# Patient Record
Sex: FEMALE | Race: WHITE | NOT HISPANIC OR LATINO | URBAN - METROPOLITAN AREA
[De-identification: names, ages, dates, MRNs, and addresses within clinical notes are randomized per-mention and may not be internally consistent; named-entity substitution may affect disease eponyms.]

---

## 2020-10-12 ENCOUNTER — TELEPHONE ENCOUNTER (OUTPATIENT)
Dept: URBAN - METROPOLITAN AREA CLINIC 117 | Facility: CLINIC | Age: 60
End: 2020-10-12

## 2020-10-13 ENCOUNTER — OUT OF OFFICE VISIT (OUTPATIENT)
Dept: URBAN - METROPOLITAN AREA MEDICAL CENTER 16 | Facility: MEDICAL CENTER | Age: 60
End: 2020-10-13
Payer: MEDICARE

## 2020-10-13 DIAGNOSIS — R19.7 ACUTE DIARRHEA: ICD-10-CM

## 2020-10-13 DIAGNOSIS — K82.8 ADENOMYOSIS OF GALLBLADDER: ICD-10-CM

## 2020-10-13 DIAGNOSIS — K80.20 ASYMPTOMATIC CHOLELITHIASIS: ICD-10-CM

## 2020-10-13 DIAGNOSIS — R10.31 ABDOMINAL DISCOMFORT IN RIGHT LOWER QUADRANT: ICD-10-CM

## 2020-10-13 DIAGNOSIS — A04.72 C. DIFFICILE COLITIS: ICD-10-CM

## 2020-10-13 PROCEDURE — 99232 SBSQ HOSP IP/OBS MODERATE 35: CPT | Performed by: INTERNAL MEDICINE

## 2020-10-13 PROCEDURE — G8427 DOCREV CUR MEDS BY ELIG CLIN: HCPCS | Performed by: INTERNAL MEDICINE

## 2020-10-13 PROCEDURE — 99233 SBSQ HOSP IP/OBS HIGH 50: CPT | Performed by: INTERNAL MEDICINE

## 2020-10-13 PROCEDURE — 99222 1ST HOSP IP/OBS MODERATE 55: CPT | Performed by: INTERNAL MEDICINE

## 2020-10-17 ENCOUNTER — OUT OF OFFICE VISIT (OUTPATIENT)
Dept: URBAN - METROPOLITAN AREA MEDICAL CENTER 16 | Facility: MEDICAL CENTER | Age: 60
End: 2020-10-17
Payer: MEDICARE

## 2020-10-17 DIAGNOSIS — E87.6 HYPOKALEMIA: ICD-10-CM

## 2020-10-17 DIAGNOSIS — I10 ACCELERATED ESSENTIAL HYPERTENSION: ICD-10-CM

## 2020-10-17 DIAGNOSIS — R10.31 ABDOMINAL DISCOMFORT IN RIGHT LOWER QUADRANT: ICD-10-CM

## 2020-10-17 DIAGNOSIS — A04.72 C. DIFFICILE COLITIS: ICD-10-CM

## 2020-10-17 PROCEDURE — 99232 SBSQ HOSP IP/OBS MODERATE 35: CPT | Performed by: INTERNAL MEDICINE

## 2020-10-22 ENCOUNTER — TELEPHONE ENCOUNTER (OUTPATIENT)
Dept: URBAN - METROPOLITAN AREA CLINIC 117 | Facility: CLINIC | Age: 60
End: 2020-10-22

## 2020-12-09 ENCOUNTER — OFFICE VISIT (OUTPATIENT)
Dept: URBAN - METROPOLITAN AREA CLINIC 118 | Facility: CLINIC | Age: 60
End: 2020-12-09

## 2021-02-03 ENCOUNTER — OFFICE VISIT (OUTPATIENT)
Dept: URBAN - METROPOLITAN AREA CLINIC 118 | Facility: CLINIC | Age: 61
End: 2021-02-03

## 2021-06-21 ENCOUNTER — LAB OUTSIDE AN ENCOUNTER (OUTPATIENT)
Dept: URBAN - METROPOLITAN AREA CLINIC 118 | Facility: CLINIC | Age: 61
End: 2021-06-21

## 2021-06-21 ENCOUNTER — CLAIMS CREATED FROM THE CLAIM WINDOW (OUTPATIENT)
Dept: URBAN - METROPOLITAN AREA CLINIC 118 | Facility: CLINIC | Age: 61
End: 2021-06-21
Payer: MEDICARE

## 2021-06-21 VITALS
WEIGHT: 293 LBS | BODY MASS INDEX: 45.99 KG/M2 | HEART RATE: 65 BPM | TEMPERATURE: 99.2 F | SYSTOLIC BLOOD PRESSURE: 138 MMHG | DIASTOLIC BLOOD PRESSURE: 81 MMHG | HEIGHT: 67 IN

## 2021-06-21 DIAGNOSIS — D3A.8 NEUROENDOCRINE NEOPLASM OF APPENDIX: ICD-10-CM

## 2021-06-21 DIAGNOSIS — K62.5 RECTAL BLEEDING: ICD-10-CM

## 2021-06-21 DIAGNOSIS — R19.4 BOWEL HABIT CHANGES: ICD-10-CM

## 2021-06-21 DIAGNOSIS — Z86.010 PERSONAL HISTORY OF COLONIC POLYPS: ICD-10-CM

## 2021-06-21 DIAGNOSIS — R10.30 LOWER ABDOMINAL PAIN: ICD-10-CM

## 2021-06-21 PROBLEM — 428283002: Status: ACTIVE | Noted: 2021-06-21

## 2021-06-21 PROBLEM — 54586004 LOWER ABDOMINAL PAIN: Status: ACTIVE | Noted: 2021-06-21

## 2021-06-21 PROCEDURE — 99214 OFFICE O/P EST MOD 30 MIN: CPT | Performed by: INTERNAL MEDICINE

## 2021-06-21 PROCEDURE — 99204 OFFICE O/P NEW MOD 45 MIN: CPT | Performed by: INTERNAL MEDICINE

## 2021-06-21 RX ORDER — METHYLNALTREXONE BROMIDE 150 MG/1
TAKE 3 TABLETS (450 MG) BY ORAL ROUTE ONCE DAILY IN THE MORNING TABLET ORAL 1
Qty: 0 | Refills: 0 | Status: ACTIVE | COMMUNITY
Start: 1900-01-01

## 2021-06-21 RX ORDER — OXYCODONE HYDROCHLORIDE AND ACETAMINOPHEN 10; 325 MG/1; MG/1
TABLET ORAL
Qty: 0 | Refills: 0 | Status: ACTIVE | COMMUNITY
Start: 1900-01-01

## 2021-06-21 NOTE — HPI-TODAY'S VISIT:
59 yo F was referred by Dr. Rebeca Hobson for colonoscopy. A copy of this document will be sent to the provider. Pt had episode of RLQ pain in 4/2021 with CT showing thickening of distal appendix, single gallstone. Laparoscopic appendectomy was completed on 4/28/21 with evidence of neuroendocrine tumor. Pt following oncology for workup of possible carcinoid syndrome given active pulmonary sx, chronic GI sx, hot flashes. Last colonoscopy on 8/11/2014 with TA polyp, diverticulosis, internal hemorrhoids. Today, she complains of altering diarrhea and constipation for several years. She notes BRBPR filling toilet bowl with constipation and hard stools. She also complains of lower abd pain that is worsening, no relief with BMs. Pt admits SOB, occasional wheezing. She denies unintentional weight loss, recent abx use, fever. FH of colon cancer in grandmother.

## 2021-06-21 NOTE — PHYSICAL EXAM CONSTITUTIONAL:
normal,  alert,  in no acute distress,  well developed, well nourished, ambulating with walker, normal communication ability

## 2021-06-21 NOTE — PHYSICAL EXAM GASTROINTESTINAL
Abdomen,  soft, bilateral lower abd TTP, nondistended,  no guarding or rigidity,  no masses palpable,  normal bowel sounds,  Liver and Spleen, no hepatosplenomegaly

## 2021-06-22 LAB
BASO (ABSOLUTE): 0
BASOS: 0
EOS (ABSOLUTE): 0.2
EOS: 2
HEMATOCRIT: 39
HEMATOLOGY COMMENTS:: (no result)
HEMOGLOBIN: 12
IMMATURE CELLS: (no result)
IMMATURE GRANS (ABS): 0
IMMATURE GRANULOCYTES: 0
LYMPHS (ABSOLUTE): 1.8
LYMPHS: 21
MCH: 27.5
MCHC: 30.8
MCV: 89
MONOCYTES(ABSOLUTE): 0.8
MONOCYTES: 10
NEUTROPHILS (ABSOLUTE): 5.7
NEUTROPHILS: 67
NRBC: (no result)
PLATELETS: 267
RBC: 4.36
RDW: 14.6
WBC: 8.5

## 2021-06-26 ENCOUNTER — TELEPHONE ENCOUNTER (OUTPATIENT)
Dept: URBAN - METROPOLITAN AREA CLINIC 92 | Facility: CLINIC | Age: 61
End: 2021-06-26

## 2021-06-26 RX ORDER — AMOXICILLIN AND CLAVULANATE POTASSIUM 500; 125 MG/1; 1/1
1 TABLET TABLET, FILM COATED ORAL
Qty: 21 | Refills: 0 | OUTPATIENT
Start: 2021-06-26 | End: 2021-07-03

## 2021-07-01 ENCOUNTER — TELEPHONE ENCOUNTER (OUTPATIENT)
Dept: URBAN - METROPOLITAN AREA CLINIC 118 | Facility: CLINIC | Age: 61
End: 2021-07-01

## 2021-07-19 ENCOUNTER — TELEPHONE ENCOUNTER (OUTPATIENT)
Dept: URBAN - METROPOLITAN AREA CLINIC 118 | Facility: CLINIC | Age: 61
End: 2021-07-19

## 2021-07-23 ENCOUNTER — LAB OUTSIDE AN ENCOUNTER (OUTPATIENT)
Dept: URBAN - METROPOLITAN AREA CLINIC 118 | Facility: CLINIC | Age: 61
End: 2021-07-23

## 2021-07-23 ENCOUNTER — TELEPHONE ENCOUNTER (OUTPATIENT)
Dept: URBAN - METROPOLITAN AREA CLINIC 118 | Facility: CLINIC | Age: 61
End: 2021-07-23

## 2021-08-12 ENCOUNTER — OFFICE VISIT (OUTPATIENT)
Dept: URBAN - METROPOLITAN AREA SURGERY CENTER 23 | Facility: SURGERY CENTER | Age: 61
End: 2021-08-12

## 2021-08-12 ENCOUNTER — TELEPHONE ENCOUNTER (OUTPATIENT)
Dept: URBAN - METROPOLITAN AREA CLINIC 118 | Facility: CLINIC | Age: 61
End: 2021-08-12

## 2021-08-12 RX ORDER — METHYLNALTREXONE BROMIDE 150 MG/1
TAKE 3 TABLETS (450 MG) BY ORAL ROUTE ONCE DAILY IN THE MORNING TABLET ORAL 1
Qty: 0 | Refills: 0 | Status: ACTIVE | COMMUNITY
Start: 1900-01-01

## 2021-08-12 RX ORDER — OXYCODONE HYDROCHLORIDE AND ACETAMINOPHEN 10; 325 MG/1; MG/1
TABLET ORAL
Qty: 0 | Refills: 0 | Status: ACTIVE | COMMUNITY
Start: 1900-01-01

## 2021-08-13 PROBLEM — 12063002: Status: ACTIVE | Noted: 2021-06-21

## 2021-08-13 PROBLEM — 725167001: Status: ACTIVE | Noted: 2021-06-21

## 2021-08-26 ENCOUNTER — OFFICE VISIT (OUTPATIENT)
Dept: URBAN - METROPOLITAN AREA SURGERY CENTER 23 | Facility: SURGERY CENTER | Age: 61
End: 2021-08-26
Payer: MEDICARE

## 2021-08-26 DIAGNOSIS — Z12.11 AVERAGE RISK FOR CRC. DUE TO PT'S CO-MORBID STATE WITH END STAGE DEMENTIA, HIGH RISK FOR ANESTHESIA (PER NEUROLOGY); INABILITY TO TAKE A BOWEL PREP....WOULD NOT ADVISE ANY COLORECTAL CANCER SCREENING INCLUDING STOOL TEST FOR FECAL BLOOD.: ICD-10-CM

## 2021-08-26 DIAGNOSIS — Z53.8 FAILED ATTEMPTED SURGICAL PROCEDURE: ICD-10-CM

## 2021-08-26 PROCEDURE — G0121 COLON CA SCRN NOT HI RSK IND: HCPCS | Performed by: INTERNAL MEDICINE

## 2021-08-26 PROCEDURE — G8907 PT DOC NO EVENTS ON DISCHARG: HCPCS | Performed by: INTERNAL MEDICINE

## 2021-08-26 RX ORDER — METHYLNALTREXONE BROMIDE 150 MG/1
TAKE 3 TABLETS (450 MG) BY ORAL ROUTE ONCE DAILY IN THE MORNING TABLET ORAL 1
Qty: 0 | Refills: 0 | Status: ACTIVE | COMMUNITY
Start: 1900-01-01

## 2021-08-26 RX ORDER — OXYCODONE HYDROCHLORIDE AND ACETAMINOPHEN 10; 325 MG/1; MG/1
TABLET ORAL
Qty: 0 | Refills: 0 | Status: ACTIVE | COMMUNITY
Start: 1900-01-01

## 2021-11-30 ENCOUNTER — OFFICE VISIT (OUTPATIENT)
Dept: URBAN - METROPOLITAN AREA CLINIC 118 | Facility: CLINIC | Age: 61
End: 2021-11-30
Payer: MEDICARE

## 2021-11-30 DIAGNOSIS — A04.72 C. DIFFICILE COLITIS: ICD-10-CM

## 2021-11-30 DIAGNOSIS — R19.4 CHANGE IN BOWEL HABITS: ICD-10-CM

## 2021-11-30 PROCEDURE — 99214 OFFICE O/P EST MOD 30 MIN: CPT | Performed by: INTERNAL MEDICINE

## 2021-11-30 RX ORDER — OXYCODONE HYDROCHLORIDE AND ACETAMINOPHEN 10; 325 MG/1; MG/1
TABLET ORAL
Qty: 0 | Refills: 0 | Status: ACTIVE | COMMUNITY
Start: 1900-01-01

## 2021-11-30 RX ORDER — HYDROCORTISONE 2.5% 25 MG/G
1 APPLICATION CREAM TOPICAL TWICE A DAY
Qty: 30 | Refills: 3 | OUTPATIENT
Start: 2021-11-30 | End: 2022-03-30

## 2021-11-30 RX ORDER — METHYLNALTREXONE BROMIDE 150 MG/1
TAKE 3 TABLETS (450 MG) BY ORAL ROUTE ONCE DAILY IN THE MORNING TABLET ORAL 1
Qty: 0 | Refills: 0 | Status: DISCONTINUED | COMMUNITY
Start: 1900-01-01

## 2021-11-30 NOTE — HPI-TODAY'S VISIT:
pt reports h/o C. diff in 9/2020 and was hospitalized, left hospital before completing treatment (per pt) now with bowel changes. pt reprots 2-3 loose stools per day followed by days of constipation. pt denies rectal bleeing, weight loss or anemia. pt feels symptoms related to C. diff. No weight loss or anemia. No UGI symptoms. pt feels symptoms destroying quality of life and would like further management.

## 2021-12-08 LAB — C DIFFICILE TOXIN GENE NAA: NEGATIVE

## 2022-04-26 ENCOUNTER — TELEPHONE ENCOUNTER (OUTPATIENT)
Dept: URBAN - METROPOLITAN AREA CLINIC 118 | Facility: CLINIC | Age: 62
End: 2022-04-26

## 2022-04-26 ENCOUNTER — OUT OF OFFICE VISIT (OUTPATIENT)
Dept: URBAN - METROPOLITAN AREA MEDICAL CENTER 16 | Facility: MEDICAL CENTER | Age: 62
End: 2022-04-26
Payer: MEDICARE

## 2022-04-26 DIAGNOSIS — K92.1 ACUTE MELENA: ICD-10-CM

## 2022-04-26 DIAGNOSIS — K80.20 ASYMPTOMATIC CHOLELITHIASIS: ICD-10-CM

## 2022-04-26 DIAGNOSIS — K76.0 FATTY (CHANGE OF) LIVER: ICD-10-CM

## 2022-04-26 DIAGNOSIS — K57.30 ACQUIRED DIVERTICULOSIS OF COLON: ICD-10-CM

## 2022-04-26 PROCEDURE — 99222 1ST HOSP IP/OBS MODERATE 55: CPT | Performed by: INTERNAL MEDICINE

## 2022-04-26 PROCEDURE — G8427 DOCREV CUR MEDS BY ELIG CLIN: HCPCS | Performed by: INTERNAL MEDICINE

## 2022-04-27 ENCOUNTER — OUT OF OFFICE VISIT (OUTPATIENT)
Dept: URBAN - METROPOLITAN AREA MEDICAL CENTER 16 | Facility: MEDICAL CENTER | Age: 62
End: 2022-04-27
Payer: MEDICARE

## 2022-04-27 DIAGNOSIS — K25.9 ANTRAL ULCER: ICD-10-CM

## 2022-04-27 DIAGNOSIS — K29.80 ACUTE DUODENITIS: ICD-10-CM

## 2022-04-27 DIAGNOSIS — K29.60 ADENOPAPILLOMATOSIS GASTRICA: ICD-10-CM

## 2022-04-27 DIAGNOSIS — K22.2 ACQUIRED ESOPHAGEAL RING: ICD-10-CM

## 2022-04-27 PROCEDURE — 43239 EGD BIOPSY SINGLE/MULTIPLE: CPT | Performed by: INTERNAL MEDICINE

## 2022-04-29 ENCOUNTER — TELEPHONE ENCOUNTER (OUTPATIENT)
Dept: URBAN - METROPOLITAN AREA CLINIC 118 | Facility: CLINIC | Age: 62
End: 2022-04-29

## 2022-05-10 ENCOUNTER — OFFICE VISIT (OUTPATIENT)
Dept: URBAN - METROPOLITAN AREA CLINIC 118 | Facility: CLINIC | Age: 62
End: 2022-05-10
Payer: MEDICARE

## 2022-05-10 DIAGNOSIS — R10.13 EPIGASTRIC PAIN: ICD-10-CM

## 2022-05-10 DIAGNOSIS — K27.9 PUD (PEPTIC ULCER DISEASE): ICD-10-CM

## 2022-05-10 DIAGNOSIS — R19.4 BOWEL HABIT CHANGES: ICD-10-CM

## 2022-05-10 PROBLEM — 129851009: Status: ACTIVE | Noted: 2021-06-21

## 2022-05-10 PROBLEM — 13200003: Status: ACTIVE | Noted: 2022-05-10

## 2022-05-10 PROCEDURE — 99214 OFFICE O/P EST MOD 30 MIN: CPT | Performed by: INTERNAL MEDICINE

## 2022-05-10 RX ORDER — OXYCODONE HYDROCHLORIDE AND ACETAMINOPHEN 10; 325 MG/1; MG/1
TABLET ORAL
Qty: 0 | Refills: 0 | Status: ACTIVE | COMMUNITY
Start: 1900-01-01

## 2022-05-10 NOTE — HPI-TODAY'S VISIT:
pt presents for follow up of UGI symptoms. pt presented for UGI bleed with pud noted on egd. pt on PPI qd. pt notes constipation since dc with only 1 bm which was black. No hematemesis or other signs GI bleed. pt aslo notes constipation with q3-4 days bm's. No weight loss or other complaints. Pt uptodate on colon cancer screening (8/2021)

## 2022-05-11 LAB
BASO (ABSOLUTE): 0.1
BASOS: 1
EOS (ABSOLUTE): 0.2
EOS: 2
HEMATOCRIT: 33
HEMATOLOGY COMMENTS:: (no result)
HEMOGLOBIN: 10
IMMATURE CELLS: (no result)
IMMATURE GRANS (ABS): 0
IMMATURE GRANULOCYTES: 0
LYMPHS (ABSOLUTE): 2.1
LYMPHS: 22
MCH: 28.7
MCHC: 30.3
MCV: 95
MONOCYTES(ABSOLUTE): 0.8
MONOCYTES: 9
NEUTROPHILS (ABSOLUTE): 6.2
NEUTROPHILS: 66
NRBC: (no result)
PLATELETS: 391
RBC: 3.48
RDW: 14.7
WBC: 9.5

## 2022-07-18 ENCOUNTER — OFFICE VISIT (OUTPATIENT)
Dept: URBAN - METROPOLITAN AREA CLINIC 118 | Facility: CLINIC | Age: 62
End: 2022-07-18

## 2023-07-19 ENCOUNTER — OUT OF OFFICE VISIT (OUTPATIENT)
Dept: URBAN - METROPOLITAN AREA MEDICAL CENTER 16 | Facility: MEDICAL CENTER | Age: 63
End: 2023-07-19
Payer: MEDICARE

## 2023-07-19 DIAGNOSIS — R11.11 INTRACTABLE VOMITING WITHOUT NAUSEA: ICD-10-CM

## 2023-07-19 DIAGNOSIS — R41.82 ALTERED MENTAL STATE: ICD-10-CM

## 2023-07-19 PROCEDURE — G8427 DOCREV CUR MEDS BY ELIG CLIN: HCPCS

## 2023-07-19 PROCEDURE — 99232 SBSQ HOSP IP/OBS MODERATE 35: CPT

## 2023-07-19 PROCEDURE — 99222 1ST HOSP IP/OBS MODERATE 55: CPT

## 2023-10-10 ENCOUNTER — LAB OUTSIDE AN ENCOUNTER (OUTPATIENT)
Dept: URBAN - METROPOLITAN AREA CLINIC 118 | Facility: CLINIC | Age: 63
End: 2023-10-10

## 2023-10-10 ENCOUNTER — OFFICE VISIT (OUTPATIENT)
Dept: URBAN - METROPOLITAN AREA CLINIC 118 | Facility: CLINIC | Age: 63
End: 2023-10-10
Payer: MEDICARE

## 2023-10-10 ENCOUNTER — OFFICE VISIT (OUTPATIENT)
Dept: URBAN - METROPOLITAN AREA CLINIC 117 | Facility: CLINIC | Age: 63
End: 2023-10-10
Payer: MEDICARE

## 2023-10-10 VITALS
TEMPERATURE: 97.9 F | HEART RATE: 103 BPM | HEIGHT: 66 IN | DIASTOLIC BLOOD PRESSURE: 65 MMHG | BODY MASS INDEX: 45.96 KG/M2 | SYSTOLIC BLOOD PRESSURE: 125 MMHG | WEIGHT: 286 LBS

## 2023-10-10 DIAGNOSIS — R19.5 DARK STOOLS: ICD-10-CM

## 2023-10-10 DIAGNOSIS — Z87.11 HISTORY OF GASTRIC ULCER: ICD-10-CM

## 2023-10-10 PROCEDURE — G0328 FECAL BLOOD SCRN IMMUNOASSAY: HCPCS | Performed by: INTERNAL MEDICINE

## 2023-10-10 PROCEDURE — 99214 OFFICE O/P EST MOD 30 MIN: CPT

## 2023-10-10 RX ORDER — PANTOPRAZOLE SODIUM 40 MG/1
1 TABLET TABLET, DELAYED RELEASE ORAL ONCE A DAY
Qty: 30 | Refills: 5 | OUTPATIENT
Start: 2023-10-10

## 2023-10-10 RX ORDER — OXYCODONE HYDROCHLORIDE AND ACETAMINOPHEN 10; 325 MG/1; MG/1
TABLET ORAL
Qty: 0 | Refills: 0 | Status: ACTIVE | COMMUNITY
Start: 1900-01-01

## 2023-10-10 NOTE — HPI-TODAY'S VISIT:
Ms. Hdz is a 61 y/o WF who presents today for f/u appt.  She was admitted to [facility] 4/2022 due to melena and underwent EGD 4/26/22 with Dr. Suárez which showed two small clean based ulcers 5-6 mm in the antrum, a nonobstructing esophageal ring in the distal esophagus and a small HH. Bx benign.  She was then admitted to [facility] this year from 7/19 to 7/26 after she had a ground level fall and hematemesis. She was conservatively managed, no scopes peformed. Hgb at discharge was 11.2.  Patient reports x1 episode of dark stools per week. She does not use pepto/ iron. She denies black, tarry stools, hematemesis or hematocehzia. She denies abdominal pain, NV, unintentional weight loss or changes in bowel habits. Reflux is controlled on famotidine daily. She is not on blood thinner. Denies NSAID use.  Last colonoscopy was with Dr. Santos in 2021 with poor prep, recommended to repeat in 1 year..

## 2023-10-11 ENCOUNTER — LAB OUTSIDE AN ENCOUNTER (OUTPATIENT)
Dept: URBAN - METROPOLITAN AREA CLINIC 118 | Facility: CLINIC | Age: 63
End: 2023-10-11

## 2023-10-11 ENCOUNTER — TELEPHONE ENCOUNTER (OUTPATIENT)
Dept: URBAN - METROPOLITAN AREA CLINIC 118 | Facility: CLINIC | Age: 63
End: 2023-10-11

## 2023-10-11 LAB
ABSOLUTE BASOPHILS: 30
ABSOLUTE EOSINOPHILS: 259
ABSOLUTE LYMPHOCYTES: 1140
ABSOLUTE MONOCYTES: 607
ABSOLUTE NEUTROPHILS: 5365
BASOPHILS: 0.4
EOSINOPHILS: 3.5
HEMATOCRIT: 37.2
HEMOGLOBIN: 11.6
IRON BIND.CAP.(TIBC): 360
IRON SATURATION: 21
IRON: 77
LYMPHOCYTES: 15.4
MCH: 27.6
MCHC: 31.2
MCV: 88.4
MONOCYTES: 8.2
MPV: 12
NEUTROPHILS: 72.5
PLATELET COUNT: 255
RDW: 14.3
RED BLOOD CELL COUNT: 4.21
WHITE BLOOD CELL COUNT: 7.4

## 2023-10-11 RX ORDER — POLYETHYLENE GLYCOL 3350, SODIUM SULFATE ANHYDROUS, SODIUM BICARBONATE, SODIUM CHLORIDE, POTASSIUM CHLORIDE 236; 22.74; 6.74; 5.86; 2.97 G/4L; G/4L; G/4L; G/4L; G/4L
ML POWDER, FOR SOLUTION ORAL
Qty: 1 | Refills: 0 | OUTPATIENT
Start: 2023-10-11 | End: 2023-10-12

## 2024-03-28 ENCOUNTER — OV EP (OUTPATIENT)
Dept: URBAN - METROPOLITAN AREA CLINIC 118 | Facility: CLINIC | Age: 64
End: 2024-03-28
Payer: MEDICARE

## 2024-03-28 VITALS
DIASTOLIC BLOOD PRESSURE: 85 MMHG | WEIGHT: 283.2 LBS | HEIGHT: 66 IN | TEMPERATURE: 97.5 F | BODY MASS INDEX: 45.51 KG/M2 | SYSTOLIC BLOOD PRESSURE: 155 MMHG | HEART RATE: 66 BPM

## 2024-03-28 DIAGNOSIS — R19.5 DARK STOOLS: ICD-10-CM

## 2024-03-28 DIAGNOSIS — R19.7 ACUTE DIARRHEA: ICD-10-CM

## 2024-03-28 DIAGNOSIS — Z12.11 SCREENING FOR COLON CANCER: ICD-10-CM

## 2024-03-28 DIAGNOSIS — Z87.11 HISTORY OF GASTRIC ULCER: ICD-10-CM

## 2024-03-28 PROCEDURE — 99213 OFFICE O/P EST LOW 20 MIN: CPT | Performed by: INTERNAL MEDICINE

## 2024-03-28 RX ORDER — OXYCODONE HYDROCHLORIDE AND ACETAMINOPHEN 10; 325 MG/1; MG/1
TABLET ORAL
Qty: 0 | Refills: 0 | Status: ACTIVE | COMMUNITY
Start: 1900-01-01

## 2024-03-28 RX ORDER — PANTOPRAZOLE SODIUM 40 MG/1
1 TABLET TABLET, DELAYED RELEASE ORAL ONCE A DAY
Qty: 30 | Refills: 5 | Status: ACTIVE | COMMUNITY
Start: 2023-10-10

## 2024-03-28 NOTE — HPI-TODAY'S VISIT:
3/28/24 diarrhea and abd pain one day since on abx for uti also right flank pain no fevers  10/10/23 Vi Hdz is a 61 y/o WF who presents today for f/u appt.  She was admitted to [facility] 4/2022 due to melena and underwent EGD 4/26/22 with Dr. Suárez which showed two small clean based ulcers 5-6 mm in the antrum, a nonobstructing esophageal ring in the distal esophagus and a small HH. Bx benign.  She was then admitted to [facility] this year from 7/19 to 7/26 after she had a ground level fall and hematemesis. She was conservatively managed, no scopes peformed. Hgb at discharge was 11.2.  Patient reports x1 episode of dark stools per week. She does not use pepto/ iron. She denies black, tarry stools, hematemesis or hematocehzia. She denies abdominal pain, NV, unintentional weight loss or changes in bowel habits. Reflux is controlled on famotidine daily. She is not on blood thinner. Denies NSAID use.  Last colonoscopy was with Dr. Santos in 2021 with poor prep, recommended to repeat in 1 year..

## 2024-05-14 ENCOUNTER — OFFICE VISIT (OUTPATIENT)
Dept: URBAN - METROPOLITAN AREA SURGERY CENTER 23 | Facility: SURGERY CENTER | Age: 64
End: 2024-05-14

## 2024-05-16 ENCOUNTER — OFFICE VISIT (OUTPATIENT)
Dept: URBAN - METROPOLITAN AREA CLINIC 118 | Facility: CLINIC | Age: 64
End: 2024-05-16

## 2024-06-11 ENCOUNTER — OFFICE VISIT (OUTPATIENT)
Dept: URBAN - METROPOLITAN AREA SURGERY CENTER 23 | Facility: SURGERY CENTER | Age: 64
End: 2024-06-11

## 2024-06-27 ENCOUNTER — TELEPHONE ENCOUNTER (OUTPATIENT)
Dept: URBAN - METROPOLITAN AREA CLINIC 118 | Facility: CLINIC | Age: 64
End: 2024-06-27

## 2024-07-03 ENCOUNTER — CLAIMS CREATED FROM THE CLAIM WINDOW (OUTPATIENT)
Dept: URBAN - METROPOLITAN AREA SURGERY CENTER 23 | Facility: SURGERY CENTER | Age: 64
End: 2024-07-03
Payer: MEDICARE

## 2024-07-03 ENCOUNTER — CLAIMS CREATED FROM THE CLAIM WINDOW (OUTPATIENT)
Dept: URBAN - METROPOLITAN AREA CLINIC 4 | Facility: CLINIC | Age: 64
End: 2024-07-03
Payer: MEDICARE

## 2024-07-03 ENCOUNTER — CLAIMS CREATED FROM THE CLAIM WINDOW (OUTPATIENT)
Dept: URBAN - METROPOLITAN AREA SURGERY CENTER 23 | Facility: SURGERY CENTER | Age: 64
End: 2024-07-03

## 2024-07-03 DIAGNOSIS — Z12.11 COLON CANCER SCREENING: ICD-10-CM

## 2024-07-03 DIAGNOSIS — D12.2 ADENOMA OF ASCENDING COLON: ICD-10-CM

## 2024-07-03 DIAGNOSIS — D12.2 BENIGN NEOPLASM OF ASCENDING COLON: ICD-10-CM

## 2024-07-03 DIAGNOSIS — E66.9 ADULT-ONSET OBESITY: ICD-10-CM

## 2024-07-03 PROCEDURE — 45385 COLONOSCOPY W/LESION REMOVAL: CPT | Performed by: INTERNAL MEDICINE

## 2024-07-03 PROCEDURE — 00811 ANES LWR INTST NDSC NOS: CPT | Performed by: NURSE ANESTHETIST, CERTIFIED REGISTERED

## 2024-07-03 PROCEDURE — 88305 TISSUE EXAM BY PATHOLOGIST: CPT | Performed by: PATHOLOGY

## 2024-07-03 RX ORDER — PANTOPRAZOLE SODIUM 40 MG/1
TAKE 1 TABLET BY MOUTH EVERY DAY TABLET, DELAYED RELEASE ORAL
Qty: 30 TABLET | Refills: 5 | Status: ACTIVE | COMMUNITY

## 2024-07-03 RX ORDER — OXYCODONE HYDROCHLORIDE AND ACETAMINOPHEN 10; 325 MG/1; MG/1
TABLET ORAL
Qty: 0 | Refills: 0 | Status: ACTIVE | COMMUNITY
Start: 1900-01-01